# Patient Record
Sex: MALE
[De-identification: names, ages, dates, MRNs, and addresses within clinical notes are randomized per-mention and may not be internally consistent; named-entity substitution may affect disease eponyms.]

---

## 2023-01-07 ENCOUNTER — NURSE TRIAGE (OUTPATIENT)
Dept: OTHER | Facility: CLINIC | Age: 28
End: 2023-01-07

## 2023-01-07 NOTE — TELEPHONE ENCOUNTER
Location of patient: [place]    Subjective: Caller states \"painful urination tip of penis pain and discharge\"     Current Symptoms: painful urination tip of penis pain and discharge milky color was seen at [facility] and had a u/a done and was told was wnl but now pain a little worse and having discharge, no swelling no nausea no vomiting no diarrhea , no injury no blood no fevers     Onset: 7 days ago;     Associated Symptoms: NA    Pain Severity: 1/10    Temperature: none       What has been tried: u/a test     LMP: NA Pregnant: NA    Recommended disposition: See PCP within 24 Hours    Care advice provided, patient verbalizes understanding; denies any other questions or concerns; instructed to call back for any new or worsening symptoms. To be seen in the next 24 hours since its a weekend will go to [facility]/er     This triage is a result of a call to [address] [place] [address]. Please do not respond to the triage nurse through this encounter. Any subsequent communication should be directly with the patient.       Reason for Disposition   Urinating more frequently than usual (i.e., frequency)    Protocols used: Urinary Symptoms-ADULT-

## 2023-12-05 ENCOUNTER — TELEPHONE (OUTPATIENT)
Dept: GASTROENTEROLOGY | Facility: CLINIC | Age: 28
End: 2023-12-05

## 2023-12-06 NOTE — TELEPHONE ENCOUNTER
Patients wife called stating patient was in to much to travel for the appointment.  Patient wife was told patient needed to go back to ER with the pain.